# Patient Record
Sex: FEMALE | URBAN - METROPOLITAN AREA
[De-identification: names, ages, dates, MRNs, and addresses within clinical notes are randomized per-mention and may not be internally consistent; named-entity substitution may affect disease eponyms.]

---

## 2024-05-14 ENCOUNTER — TELEPHONE ENCOUNTER (OUTPATIENT)
Dept: URBAN - METROPOLITAN AREA CLINIC 64 | Facility: CLINIC | Age: 74
End: 2024-05-14

## 2024-07-16 ENCOUNTER — OFFICE VISIT (OUTPATIENT)
Dept: URBAN - METROPOLITAN AREA CLINIC 63 | Facility: CLINIC | Age: 74
End: 2024-07-16
Payer: MEDICARE

## 2024-07-16 ENCOUNTER — DASHBOARD ENCOUNTERS (OUTPATIENT)
Age: 74
End: 2024-07-16

## 2024-07-16 VITALS
SYSTOLIC BLOOD PRESSURE: 140 MMHG | WEIGHT: 168 LBS | DIASTOLIC BLOOD PRESSURE: 78 MMHG | OXYGEN SATURATION: 98 % | HEART RATE: 68 BPM | BODY MASS INDEX: 29.77 KG/M2 | HEIGHT: 63 IN | TEMPERATURE: 97.3 F

## 2024-07-16 DIAGNOSIS — Z12.11 COLON CANCER SCREENING: ICD-10-CM

## 2024-07-16 DIAGNOSIS — K52.9 CHRONIC DIARRHEA: ICD-10-CM

## 2024-07-16 DIAGNOSIS — R19.5 POSITIVE COLORECTAL CANCER SCREENING USING COLOGUARD TEST: ICD-10-CM

## 2024-07-16 PROBLEM — 236071009: Status: ACTIVE | Noted: 2024-07-16

## 2024-07-16 PROBLEM — 305058001: Status: ACTIVE | Noted: 2024-07-16

## 2024-07-16 PROCEDURE — 99204 OFFICE O/P NEW MOD 45 MIN: CPT

## 2024-07-16 RX ORDER — RIVAROXABAN 20 MG/1
1 TABLET WITH FOOD TABLET, FILM COATED ORAL ONCE A DAY
Status: ACTIVE | COMMUNITY

## 2024-07-16 RX ORDER — NITROGLYCERIN 0.3 MG/1
AS DIRECTED TABLET SUBLINGUAL
Status: ACTIVE | COMMUNITY

## 2024-07-16 RX ORDER — ROSUVASTATIN CALCIUM 10 MG/1
1 TABLET TABLET, COATED ORAL ONCE A DAY
Status: ACTIVE | COMMUNITY

## 2024-07-16 RX ORDER — PANTOPRAZOLE SODIUM 40 MG/1
1 TABLET TABLET, DELAYED RELEASE ORAL ONCE A DAY
Status: ACTIVE | COMMUNITY

## 2024-07-16 RX ORDER — ISOSORBIDE MONONITRATE 60 MG/1
1 TABLET IN THE MORNING TABLET, EXTENDED RELEASE ORAL ONCE A DAY
Status: ACTIVE | COMMUNITY

## 2024-07-16 RX ORDER — UMECLIDINIUM BROMIDE AND VILANTEROL TRIFENATATE 62.5; 25 UG/1; UG/1
1 PUFF POWDER RESPIRATORY (INHALATION) ONCE A DAY
Status: ACTIVE | COMMUNITY

## 2024-07-16 NOTE — HPI-TODAY'S VISIT:
This is a very pleasant  74-year-old female who presents today with a chief complaint of screening colonoscopy evaluation due to age.  She is a new patient to our practice and will be establishing under Dr. Schwartz today. Past medical history is significant for COPD/Emphasema, stomach ulcers, acute MI (30 years ago), renal stent, history of PE. On Xarelto due to Protein C defieiceny by cardiologist. Past surgical history is significant for cataract surgery. Family history is significant for paternal and fraternal prostate cancer.  Patient is colonosocpy and endoscopy naive. Cardiologist: Dr. Duke, [facility] . Patient presents today for a screening colonoscopy after a positive Cologuard test in Dec 2023.  In addition, she complains of loose stools that have been ongoing for 7 to 8 months. She explains that these looser stools generally happen in the morning, and seems to have a pattern towards dairy products, like mayonnaise and yogurt.  I explained that this could be due to lactose intolerance, and advised the patient to try a lactose elimination diet for 2 weeks.  Otherwise she is doing well from a GI perspective. Rule out microscopic colitis on colonoscopy. . Patient denies abdominal pain, belching, bloating, constipation, dysphagia, pyrosis, melena, hematochezia, hematemesis, unintentional weight loss, nausea, and vomiting.

## 2024-07-23 ENCOUNTER — LAB OUTSIDE AN ENCOUNTER (OUTPATIENT)
Dept: URBAN - METROPOLITAN AREA CLINIC 63 | Facility: CLINIC | Age: 74
End: 2024-07-23

## 2024-08-19 ENCOUNTER — TELEPHONE ENCOUNTER (OUTPATIENT)
Dept: URBAN - METROPOLITAN AREA CLINIC 64 | Facility: CLINIC | Age: 74
End: 2024-08-19

## 2024-09-06 ENCOUNTER — TELEPHONE ENCOUNTER (OUTPATIENT)
Dept: URBAN - METROPOLITAN AREA CLINIC 63 | Facility: CLINIC | Age: 74
End: 2024-09-06

## 2024-09-10 ENCOUNTER — OFFICE VISIT (OUTPATIENT)
Dept: URBAN - METROPOLITAN AREA CLINIC 63 | Facility: CLINIC | Age: 74
End: 2024-09-10
Payer: MEDICARE

## 2024-09-10 VITALS
HEART RATE: 71 BPM | TEMPERATURE: 97.4 F | BODY MASS INDEX: 29.59 KG/M2 | SYSTOLIC BLOOD PRESSURE: 140 MMHG | WEIGHT: 167 LBS | DIASTOLIC BLOOD PRESSURE: 82 MMHG | HEIGHT: 63 IN | OXYGEN SATURATION: 98 %

## 2024-09-10 DIAGNOSIS — K52.9 CHRONIC DIARRHEA: ICD-10-CM

## 2024-09-10 DIAGNOSIS — R19.5 POSITIVE COLORECTAL CANCER SCREENING USING COLOGUARD TEST: ICD-10-CM

## 2024-09-10 PROCEDURE — 99214 OFFICE O/P EST MOD 30 MIN: CPT | Performed by: INTERNAL MEDICINE

## 2024-09-10 PROCEDURE — 99204 OFFICE O/P NEW MOD 45 MIN: CPT | Performed by: INTERNAL MEDICINE

## 2024-09-10 RX ORDER — FUROSEMIDE 20 MG/1
TABLET ORAL
Qty: 90 TABLET | Refills: 0 | Status: ACTIVE | COMMUNITY

## 2024-09-10 RX ORDER — UMECLIDINIUM BROMIDE AND VILANTEROL TRIFENATATE 62.5; 25 UG/1; UG/1
1 PUFF POWDER RESPIRATORY (INHALATION) ONCE A DAY
Status: ACTIVE | COMMUNITY

## 2024-09-10 RX ORDER — ROSUVASTATIN CALCIUM 10 MG/1
1 TABLET TABLET, COATED ORAL ONCE A DAY
Status: ACTIVE | COMMUNITY

## 2024-09-10 RX ORDER — FAMOTIDINE 20 MG/1
1 TABLET AT BEDTIME AS NEEDED TABLET, FILM COATED ORAL ONCE A DAY
Status: ACTIVE | COMMUNITY

## 2024-09-10 RX ORDER — VALSARTAN 160 MG/1
TAKE 1 TABLET BY MOUTH EVERY DAY TABLET, FILM COATED ORAL
Qty: 90 EACH | Refills: 0 | Status: ACTIVE | COMMUNITY

## 2024-09-10 RX ORDER — NITROGLYCERIN 0.3 MG/1
AS DIRECTED TABLET SUBLINGUAL
Status: ACTIVE | COMMUNITY

## 2024-09-10 RX ORDER — RIVAROXABAN 20 MG/1
1 TABLET WITH FOOD TABLET, FILM COATED ORAL ONCE A DAY
Status: ACTIVE | COMMUNITY

## 2024-09-10 RX ORDER — BENZONATATE 200 MG/1
TAKE 1 CAPSULE BY MOUTH THREE TIMES DAILY FOR UP TO 7 DAYS AS NEEDED FOR COUGH CAPSULE ORAL
Qty: 21 EACH | Refills: 0 | Status: ACTIVE | COMMUNITY

## 2024-09-10 RX ORDER — PANTOPRAZOLE SODIUM 40 MG/1
1 TABLET TABLET, DELAYED RELEASE ORAL ONCE A DAY
Status: ACTIVE | COMMUNITY

## 2024-09-10 RX ORDER — ISOSORBIDE MONONITRATE 60 MG/1
TAKE 1 TABLET BY MOUTH DAILY TABLET, EXTENDED RELEASE ORAL
Qty: 90 EACH | Refills: 3 | Status: ACTIVE | COMMUNITY

## 2024-09-10 NOTE — PHYSICAL EXAM GASTROINTESTINAL
Soft, nontender (unless indicated below), nondistended (unless indicated below), no guarding or rigidity (unless indicated below), normal bowel sounds There are no Wet Read(s) to document.

## 2024-09-10 NOTE — HPI-TODAY'S VISIT:
This is a very pleasant  74-year-old female who presents today with a chief complaint of screening colonoscopy evaluation due to age.  She is a new patient to our practice and will be establishing under Dr. Schwartz today. Past medical history is significant for COPD/Emphasema, stomach ulcers, acute MI (30 years ago), renal stent, history of PE. On Xarelto due to Protein C defieiceny by cardiologist. Past surgical history is significant for cataract surgery. Family history is significant for paternal and fraternal prostate cancer.  Patient is colonosocpy and endoscopy naive. Cardiologist: Dr. Duke, [facility] . Patient presents today for a screening colonoscopy after a positive Cologuard test in Dec 2023.  In addition, she complains of loose stools that have been ongoing for 7 to 8 months. She explains that these looser stools generally happen in the morning, and seems to have a pattern towards dairy products, like mayonnaise and yogurt.  I explained that this could be due to lactose intolerance, and advised the patient to try a lactose elimination diet for 2 weeks.  Otherwise she is doing well from a GI perspective. Rule out microscopic colitis on colonoscopy. . Patient denies abdominal pain, belching, bloating, constipation, dysphagia, pyrosis, melena, hematochezia, hematemesis, unintentional weight loss, nausea, and vomiting.   September 10, 2024 for preparation patient is a 74-year-old woman recently seen by Rahel for positive Cologuard and chronic diarrhea, on Xarelto with protein C deficiency as above.  Also reported history of COPD, MI 30 years ago, PE and renal stent.  Past surgical history  pertinent for appy, hys, cataract surgery.  Cardiac clearance has been requested.  Patient appears to be already scheduled for colonoscopy on September 19 but asked to keep this appointment. September 10, 2024 Qing comes in on the background of above send she does not think she has anything wrong with her colon.  She had several negative Cologuard's before the recent positive Cologuard.  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  She has been advised by her family members that she should proceed with colonoscopy.  She has protein C deficiency as noted on Xarelto and has an appointment for cardiac PET scan tomorrow and follow-up with cardiology thereafter.  I have asked her to not have her colonoscopy until her cardiac workup is complete.  She asked if she could defer this till the end of September to which I agree but I told her if there is any ongoing further workup she should not proceed with colonoscopy but deferred until she is completely cleared.  She agrees.

## 2024-09-19 ENCOUNTER — OFFICE VISIT (OUTPATIENT)
Dept: URBAN - METROPOLITAN AREA SURGERY CENTER 4 | Facility: SURGERY CENTER | Age: 74
End: 2024-09-19

## 2024-10-02 ENCOUNTER — OFFICE VISIT (OUTPATIENT)
Dept: URBAN - METROPOLITAN AREA CLINIC 63 | Facility: CLINIC | Age: 74
End: 2024-10-02

## 2024-10-03 ENCOUNTER — OFFICE VISIT (OUTPATIENT)
Dept: URBAN - METROPOLITAN AREA CLINIC 63 | Facility: CLINIC | Age: 74
End: 2024-10-03